# Patient Record
Sex: MALE
[De-identification: names, ages, dates, MRNs, and addresses within clinical notes are randomized per-mention and may not be internally consistent; named-entity substitution may affect disease eponyms.]

---

## 2024-05-13 PROBLEM — Z00.00 ENCOUNTER FOR PREVENTIVE HEALTH EXAMINATION: Status: ACTIVE | Noted: 2024-05-13

## 2024-06-03 ENCOUNTER — APPOINTMENT (OUTPATIENT)
Dept: OTHER | Facility: CLINIC | Age: 34
End: 2024-06-03

## 2024-06-03 VITALS
DIASTOLIC BLOOD PRESSURE: 86 MMHG | RESPIRATION RATE: 14 BRPM | OXYGEN SATURATION: 98 % | HEART RATE: 62 BPM | SYSTOLIC BLOOD PRESSURE: 142 MMHG | TEMPERATURE: 98.4 F

## 2024-06-04 NOTE — ASSESSMENT
[FreeTextEntry1] : LA is a 34 YOM with no past medical history who presents in clinic today for anal pain with defecation. Based on patient's description of symptoms, pictures of purple outpouchings internal to the anal sphincter, and alleviation of pain with hydrocortisone and Preparation H, this presentation is most consistent with a diagnosis of external anal hemorrhoids.  Must not miss diagnosis is colon and anal cancer, which is unlikely given the patient's acute presentation with no constitutional symptoms or family history. Anal fissure is also on the differential given his sedentary job as an [employer] , however, the picture on his phone and physical examination of the anal region do not indicate any fistulas or fissures.

## 2024-06-04 NOTE — END OF VISIT
[FreeTextEntry3] : Seen and examined with student.  Agree with plan above.  Advised to follow-up if symptoms worsen or persist [] : A student assisted with documenting this visit. I have reviewed and verified all information documented by the student, and made modifications to such information, when appropriate.

## 2024-06-04 NOTE — PHYSICAL EXAM
[Urethral Meatus] : meatus normal [Urinary Bladder Findings] : the bladder was normal on palpation [Scrotum] : the scrotum was normal [Testes Mass (___cm)] : there were no testicular masses [No Prostate Nodules] : no prostate nodules [Normal] : affect was normal and insight and judgment were intact [FreeTextEntry1] : examination of the anal region, there was no signs of redness, swelling, no external outpouching.

## 2024-06-04 NOTE — HISTORY OF PRESENT ILLNESS
[FreeTextEntry1] : I have an anal bump and it's painful [de-identified] : LA is a 34 YOM with no significant past medical history who comes into clinic for anal pain. Pain was 4/10 when he initially noticed them and is currently 2/10. Noticed a bump 1 month ago and showed the team a picture of a purple outpouching internal to the anal sphincter. 3 weeks ago he noticed bright bleeding upon defecation. He reports no diarrhea, constipation, nausea, or vomiting. He reports some straining and has bowel movements everyday.  Feces doesn't float and is not black/tarry. He finds it hard to sit especially since he is an [employer] . He believes it may be hemorrhoids. Epson salt bath and hemorrhoid cream (Cortisone cream and Preparation H) that has been improving his symptoms. He also used stool softener for 10 days and it helped. Pt reported visiting a doctor for them 1 month prior.  Past med hx: none  Family hx: Mother has diabetes, No colon cancer  Allergies: environmental allergy to dust, NKDA  Medications: stool softener, vitamins and fiber supplements  Social hx:  - [employer]  and lost health insurance - No concerns about food or financial insecurity - Diet consists of chicken or fish and rice, beans, carrots, coffee - Exercises regularly - No smoking, marijuana, or recreational drug use - Social drinker  Sexual hx: sex w/ 1 female partner, no STIs, no anal sex, no pain with sex

## 2024-06-04 NOTE — PLAN
[FreeTextEntry1] : #External Hemorrhoids - Counseled pt on using stool softener to mitigate pain with defecation, cortisone cream for <2 weeks to reduce irritation, and to maintain good hygiene when defecating.  - Changes in his diet to include more fiber and decreased fast food intake were also encouraged  #Hypertension - Pt was found to have an elevated BP on physical exam - Pt was counseled on lifestyle modifications (e.g., DASH low-sodium diet) and exercise to improve his elevated BP.

## 2024-06-04 NOTE — REVIEW OF SYSTEMS
[Negative] : Heme/Lymph [Nausea] : no nausea [Abdominal Pain] : no abdominal pain [Constipation] : no constipation [Diarrhea] : diarrhea [Vomiting] : no vomiting [Heartburn] : no heartburn [Melena] : no melena [FreeTextEntry7] : Bump w/ anal pain

## 2024-08-26 ENCOUNTER — APPOINTMENT (OUTPATIENT)
Dept: OTHER | Facility: CLINIC | Age: 34
End: 2024-08-26

## 2025-02-04 ENCOUNTER — NON-APPOINTMENT (OUTPATIENT)
Age: 35
End: 2025-02-04